# Patient Record
Sex: FEMALE | Race: WHITE | NOT HISPANIC OR LATINO | URBAN - METROPOLITAN AREA
[De-identification: names, ages, dates, MRNs, and addresses within clinical notes are randomized per-mention and may not be internally consistent; named-entity substitution may affect disease eponyms.]

---

## 2017-08-14 ENCOUNTER — OFFICE VISIT (OUTPATIENT)
Dept: FAMILY MEDICINE | Facility: CLINIC | Age: 62
End: 2017-08-14
Payer: COMMERCIAL

## 2017-08-14 VITALS
SYSTOLIC BLOOD PRESSURE: 164 MMHG | WEIGHT: 129.8 LBS | DIASTOLIC BLOOD PRESSURE: 92 MMHG | HEART RATE: 97 BPM | HEIGHT: 63 IN | TEMPERATURE: 98.1 F | OXYGEN SATURATION: 96 % | BODY MASS INDEX: 23 KG/M2

## 2017-08-14 DIAGNOSIS — Z71.89 ADVANCED DIRECTIVES, COUNSELING/DISCUSSION: ICD-10-CM

## 2017-08-14 DIAGNOSIS — Z13.29 SCREENING FOR THYROID DISORDER: ICD-10-CM

## 2017-08-14 DIAGNOSIS — Z09 FOLLOW-UP EXAM: Primary | ICD-10-CM

## 2017-08-14 DIAGNOSIS — Z13.220 SCREENING FOR LIPOID DISORDERS: ICD-10-CM

## 2017-08-14 DIAGNOSIS — Z11.59 NEED FOR HEPATITIS C SCREENING TEST: ICD-10-CM

## 2017-08-14 DIAGNOSIS — Z12.11 SPECIAL SCREENING FOR MALIGNANT NEOPLASMS, COLON: ICD-10-CM

## 2017-08-14 DIAGNOSIS — Z12.11 COLON CANCER SCREENING: ICD-10-CM

## 2017-08-14 DIAGNOSIS — Z13.1 SCREENING FOR DIABETES MELLITUS: ICD-10-CM

## 2017-08-14 DIAGNOSIS — R39.9 URINARY SYMPTOM OR SIGN: ICD-10-CM

## 2017-08-14 LAB
ALBUMIN UR-MCNC: NEGATIVE MG/DL
APPEARANCE UR: CLEAR
BILIRUB UR QL STRIP: NEGATIVE
COLOR UR AUTO: YELLOW
GLUCOSE UR STRIP-MCNC: NEGATIVE MG/DL
HGB UR QL STRIP: NEGATIVE
KETONES UR STRIP-MCNC: NEGATIVE MG/DL
LEUKOCYTE ESTERASE UR QL STRIP: NEGATIVE
NITRATE UR QL: NEGATIVE
PH UR STRIP: 6 PH (ref 5–7)
SP GR UR STRIP: <=1.005 (ref 1–1.03)
URN SPEC COLLECT METH UR: NORMAL
UROBILINOGEN UR STRIP-ACNC: 0.2 EU/DL (ref 0.2–1)

## 2017-08-14 PROCEDURE — 99214 OFFICE O/P EST MOD 30 MIN: CPT | Performed by: NURSE PRACTITIONER

## 2017-08-14 PROCEDURE — 81003 URINALYSIS AUTO W/O SCOPE: CPT | Performed by: NURSE PRACTITIONER

## 2017-08-14 NOTE — NURSING NOTE
"Chief Complaint   Patient presents with     Hospital F/U       Initial BP (!) 201/113  Pulse 97  Temp 98.1  F (36.7  C) (Oral)  Ht 5' 2.99\" (1.6 m)  Wt 129 lb 12.8 oz (58.9 kg)  SpO2 96%  BMI 23 kg/m2 Estimated body mass index is 23 kg/(m^2) as calculated from the following:    Height as of this encounter: 5' 2.99\" (1.6 m).    Weight as of this encounter: 129 lb 12.8 oz (58.9 kg).  Medication Reconciliation: complete     Nela Pittman MA  "

## 2017-08-14 NOTE — MR AVS SNAPSHOT
After Visit Summary   8/14/2017    Darla K Schoenberger    MRN: [id number]           Patient Information     Date Of Birth          1955        Visit Information        Provider Department      8/14/2017 9:00 AM Arminda Mcnulty NP [facility]        Today's Diagnoses     Urinary symptom or sign    -  1    Advanced directives, counseling/discussion        Colon cancer screening        Screening for lipoid disorders        Need for hepatitis C screening test        Special screening for malignant neoplasms, colon        Screening for thyroid disorder        Screening for diabetes mellitus          Care Instructions    Your urine test came back totally normal.  I will let you know your lab results. Please follow up if you have any health care questions or concerns.             Follow-ups after your visit        Follow-up notes from your care team     Return if symptoms worsen or fail to improve.      Future tests that were ordered for you today     Open Future Orders        Priority Expected Expires Ordered    Fecal colorectal cancer screen (FIT) Routine 9/4/2017 11/6/2017 8/14/2017            Who to contact     Normal or non-critical lab and imaging results will be communicated to you by [place] [employer], letter or phone within 4 business days after the clinic has received the results. If you do not hear from us within 7 days, please contact the clinic through [employer] or phone. If you have a critical or abnormal lab result, we will notify you by phone as soon as possible.  Submit refill requests through [employer] or call your pharmacy and they will forward the refill request to us. Please allow 3 business days for your refill to be completed.          If you need to speak with a  for additional information , please call: [phone]             Additional Information About Your Visit        [employer] Information     [employer] gives you secure access to your electronic health record.  "If you see a primary care provider, you can also send messages to your care team and make appointments. If you have questions, please call your primary care clinic.  If you do not have a primary care provider, please call [phone] and they will assist you.        Care EveryWhere ID     This is your Care EveryWhere ID. This could be used by other organizations to access your [facility] medical records  [id number]        Your Vitals Were     Pulse Temperature Height Pulse Oximetry BMI (Body Mass Index)       97 98.1  F (36.7  C) (Oral) 5' 2.99\" (1.6 m) 96% 23 kg/m2        Blood Pressure from Last 3 Encounters:   08/14/17 (!) 201/113   11/24/15 (!) 176/102   03/03/15 (!) 176/105    Weight from Last 3 Encounters:   08/14/17 129 lb 12.8 oz (58.9 kg)   11/24/15 127 lb 12.8 oz (58 kg)   03/03/15 126 lb (57.2 kg)              We Performed the Following     Glucose, whole blood     Hemoglobin A1c     Hepatitis C Screen Reflex to HCV RNA Quant and Genotype     Lipid panel reflex to direct LDL     TSH with free T4 reflex     UA reflex to Microscopic and Culture        Primary Care Provider Office Phone #    [facility] [phone]       No address on file        Equal Access to Services     ALBA MALLOY : Hadii aad ku hadasho Soomaali, waaxda luqadaha, qaybta kaalmada adeegyada, waxay terrencein liam foster. So [facility] [phone].    ATENCIÓN: Si habla español, tiene a reyes disposición servicios gratuitos de asistencia lingüística. Llame al [phone].    We comply with applicable federal civil rights laws and [place] laws. We do not discriminate on the basis of race, color, national origin, age, disability sex, sexual orientation or gender identity.            Thank you!     Thank you for choosing [facility]  for your care. Our goal is always to provide you with excellent care. Hearing back from our patients is one way we can continue to improve our services. Please take a few " minutes to complete the written survey that you may receive in the mail after your visit with us. Thank you!             Your Updated Medication List - Protect others around you: Learn how to safely use, store and throw away your medicines at www.disposemymeds.org.          This list is accurate as of: 8/14/17  9:17 AM.  Always use your most recent med list.                   Brand Name Dispense Instructions for use Diagnosis    MAGNESIUM PO           omega-3 fatty acids 1200 MG capsule      Take 1 capsule by mouth daily.        order for DME     1 each    BP cuff, brand as covered by insurance.  Dx: HTN    Elevated BP       VITAMIN D PO      Take  by mouth.

## 2017-08-14 NOTE — PATIENT INSTRUCTIONS
Your urine test came back totally normal.  I will let you know your lab results. Please follow up if you have any health care questions or concerns.

## 2017-08-14 NOTE — PROGRESS NOTES
SUBJECTIVE:                                                    Darla K Schoenberger is a 61 year old female who presents to clinic today for the following health issues:      ED/UC Followup:    Facility:  [facility]  Date of visit: 17  Reason for visit: uti  Current Status: finished cefuroxime-feeling better-would like to make sure it is gone       Would like fasting labs done.     Problem list and histories reviewed & adjusted, as indicated.  Additional history: as documented    Patient Active Problem List   Diagnosis     CARDIOVASCULAR SCREENING; LDL GOAL LESS THAN 160     Advanced directives, counseling/discussion     Elevated blood pressure reading without diagnosis of hypertension     Past Surgical History:   Procedure Laterality Date     LEEP TX, CERVICAL      at age 39       Social History   Substance Use Topics     Smoking status: Never Smoker     Smokeless tobacco: Never Used     Alcohol use 0.0 oz/week     0 Standard drinks or equivalent per week      Comment: occasional     Family History   Problem Relation Age of Onset     Hypertension Mother      Prostate Cancer Father       at age 81     DIABETES No family hx of      Coronary Artery Disease No family hx of      CEREBROVASCULAR DISEASE No family hx of      Breast Cancer No family hx of          Current Outpatient Prescriptions   Medication Sig Dispense Refill     MAGNESIUM PO        ORDER FOR DME BP cuff, brand as covered by insurance.  Dx: HTN 1 each 0     omega-3 fatty acids (FISH OIL) 1200 MG capsule Take 1 capsule by mouth daily.       Cholecalciferol (VITAMIN D PO) Take  by mouth.       Allergies   Allergen Reactions     Sulfa Drugs      BP Readings from Last 3 Encounters:   17 (!) 164/92   11/24/15 (!) 176/102   03/03/15 (!) 176/105    Wt Readings from Last 3 Encounters:   17 129 lb 12.8 oz (58.9 kg)   11/24/15 127 lb 12.8 oz (58 kg)   03/03/15 126 lb (57.2 kg)           Labs reviewed in EPIC    Reviewed and updated  "as needed this visit by clinical staff  Tobacco  Allergies  Meds  Med Hx  Surg Hx  Fam Hx  Soc Hx      Reviewed and updated as needed this visit by Provider         ROS:  Constitutional, HEENT, cardiovascular, pulmonary, GI, , musculoskeletal, neuro, skin, endocrine and psych systems are negative, except as otherwise noted.      OBJECTIVE:   BP (!) 164/92  Pulse 97  Temp 98.1  F (36.7  C) (Oral)  Ht 5' 2.99\" (1.6 m)  Wt 129 lb 12.8 oz (58.9 kg)  SpO2 96%  BMI 23 kg/m2  Body mass index is 23 kg/(m^2).  GENERAL: healthy, alert and no distress  RESP: lungs clear to auscultation - no rales, rhonchi or wheezes  CV: regular rate and rhythm, normal S1 S2, no S3 or S4, no murmur, click or rub, no peripheral edema and peripheral pulses strong  ABDOMEN: soft, nontender, no hepatosplenomegaly, no masses and bowel sounds normal  MS: no gross musculoskeletal defects noted, no edema, no CVA tenderness  SKIN: no suspicious lesions or rashes  PSYCH: mentation appears normal, affect normal/bright    Diagnostic Test Results:  See orders    ASSESSMENT/PLAN:         ICD-10-CM    1. Follow-up exam Z09    2. Urinary symptom or sign R39.9 UA reflex to Microscopic and Culture   3. Advanced directives, counseling/discussion Z71.89    4. Colon cancer screening Z12.11    5. Screening for lipoid disorders Z13.220 Lipid panel reflex to direct LDL     CANCELED: Lipid panel reflex to direct LDL   6. Need for hepatitis C screening test Z11.59 Hepatitis C Screen Reflex to HCV RNA Quant and Genotype     CANCELED: Hepatitis C Screen Reflex to HCV RNA Quant and Genotype   7. Special screening for malignant neoplasms, colon Z12.11 Fecal colorectal cancer screen (FIT)   8. Screening for thyroid disorder Z13.29 TSH with free T4 reflex     CANCELED: TSH with free T4 reflex   9. Screening for diabetes mellitus Z13.1 Glucose, whole blood     Hemoglobin A1c     CANCELED: Glucose, whole blood     CANCELED: Hemoglobin A1c       See Patient " Instructions: Your urine test came back totally normal.  I will let you know your lab results. Please follow up if you have any health care questions or concerns.     BHARGAV Arredondo  [facility]

## 2017-09-14 ENCOUNTER — TELEPHONE (OUTPATIENT)
Dept: FAMILY MEDICINE | Facility: CLINIC | Age: 62
End: 2017-09-14

## 2017-09-14 NOTE — TELEPHONE ENCOUNTER
Panel Management Review      Patient has the following on her problem list: None      Composite cancer screening  Chart review shows that this patient is due/due soon for the following Colonoscopy  Summary:    Patient is due/failing the following:   Lipid and hep c  colonoscopy    Type of outreach:    Sent [employer] message.    Questions for provider review:    None                                                                                                                                    Nela Pittman MA       Chart routed to Care Team .

## 2017-09-14 NOTE — LETTER
September 28, 2017      Darla K Schoenberger  [address]  [place] [address]        Dear Evelyn,       This is a reminder letter.     In order to ensure we are providing the best quality care, we have reviewed your chart and see that you are due for:     1.   Fasting lab work including a cholesterol check   2.   Colonoscopy ([phone] to schedule) or FIT test ([phone] to schedule lab to )     For fasting labs please fast for at least 10 hours. You can still take your medications and have water.     We greatly appreciate the opportunity to serve you.  Thank you for trusting us with your health care.     If you are now being seen elsewhere please let us know and we will remove you from the list.     Sincerely,     [facility]

## 2017-09-21 NOTE — TELEPHONE ENCOUNTER
Left message for patient to call back pod 1 hotline([phone])    Patient is due/failing the following:   Lipid and hep c  colonoscopy

## 2018-03-08 ENCOUNTER — TELEPHONE (OUTPATIENT)
Dept: FAMILY MEDICINE | Facility: CLINIC | Age: 63
End: 2018-03-08

## 2018-03-08 NOTE — TELEPHONE ENCOUNTER
Panel Management Review      Patient has the following on her problem list: None      Composite cancer screening  Chart review shows that this patient is due/due soon for the following Mammogram and Colonoscopy  Summary:    Patient is due/failing the following:   Mammogram  colonoscopy  lipid    Type of outreach:    Sent [employer] message.    Questions for provider review:    None                                                                                                                                    Nela Pittman MA       Chart routed to Care Team .

## 2018-03-15 NOTE — TELEPHONE ENCOUNTER
Spoke with pt. Declined to [facility]. Nela Pittman MA      Patient is due/failing the following:   Mammogram  colonoscopy  lipid

## 2019-02-15 ENCOUNTER — HEALTH MAINTENANCE LETTER (OUTPATIENT)
Age: 64
End: 2019-02-15

## 2020-03-02 ENCOUNTER — HEALTH MAINTENANCE LETTER (OUTPATIENT)
Age: 65
End: 2020-03-02

## 2020-12-14 ENCOUNTER — HEALTH MAINTENANCE LETTER (OUTPATIENT)
Age: 65
End: 2020-12-14

## 2021-04-18 ENCOUNTER — HEALTH MAINTENANCE LETTER (OUTPATIENT)
Age: 66
End: 2021-04-18

## 2021-10-02 ENCOUNTER — HEALTH MAINTENANCE LETTER (OUTPATIENT)
Age: 66
End: 2021-10-02

## 2022-03-19 ENCOUNTER — HEALTH MAINTENANCE LETTER (OUTPATIENT)
Age: 67
End: 2022-03-19

## 2022-05-14 ENCOUNTER — HEALTH MAINTENANCE LETTER (OUTPATIENT)
Age: 67
End: 2022-05-14

## 2022-09-03 ENCOUNTER — HEALTH MAINTENANCE LETTER (OUTPATIENT)
Age: 67
End: 2022-09-03

## 2023-06-03 ENCOUNTER — HEALTH MAINTENANCE LETTER (OUTPATIENT)
Age: 68
End: 2023-06-03